# Patient Record
Sex: FEMALE
[De-identification: names, ages, dates, MRNs, and addresses within clinical notes are randomized per-mention and may not be internally consistent; named-entity substitution may affect disease eponyms.]

---

## 2017-08-21 ENCOUNTER — HOSPITAL ENCOUNTER (OUTPATIENT)
Dept: HOSPITAL 62 - WI | Age: 52
End: 2017-08-21
Attending: INTERNAL MEDICINE
Payer: COMMERCIAL

## 2017-08-21 DIAGNOSIS — Z12.31: Primary | ICD-10-CM

## 2017-08-21 PROCEDURE — G0202 SCR MAMMO BI INCL CAD: HCPCS

## 2017-08-21 PROCEDURE — 77067 SCR MAMMO BI INCL CAD: CPT

## 2017-08-22 NOTE — WOMENS IMAGING REPORT
EXAM DESCRIPTION:  BILAT SCREENING MAMMO W/CAD



COMPLETED DATE/TIME:  8/21/2017 10:57 am



REASON FOR STUDY:  ROUTINE SCREENING; Z12.31 Z12.31  ENCNTR SCREEN MAMMOGRAM FOR MALIGNANT NEOPLASM O
F DYLAN



COMPARISON:  2009, 2016



TECHNIQUE:  Standard craniocaudal and mediolateral oblique views of each breast recorded using Mingle360a
l acquisition.



LIMITATIONS:  None.



FINDINGS:  No masses, calcifications or architectural distortion. No areas of suspicion.

Read with the assistance of CAD.

.[facility] - R2 Cenova Version 1.3

.[facility] - R2 Cenova Version 1.3

.[facility] - R2 Cenova Version 2.4

.[facility] [place] R2 Cenova Version 2.4

.[facility] - R2  Version 9.2



IMPRESSION:  NORMAL MAMMOGRAM.  BIRADS 1.



BREAST DENSITY:  a. The breasts are almost entirely fatty.



BIRAD:  1 NEGATIVE



RECOMMENDATION:  ROUTINE SCREENING



COMMENT:  The patient has been notified of the results by letter per SA requirements. Additional no
tification policies are in place for contacting patient with suspicious or incomplete findings.

Quality ID #225: The American College of Radiology recommends an annual screening mammogram for women
 aged 40 years or over. This facility utilizes a reminder system to ensure that all patients receive 
reminder letters, and/or direct phone calls for appointments. This includes reminders for routine scr
eening mammograms, diagnostic mammograms, or other Breast Imaging Interventions when appropriate.  Th
is patient will be placed in the appropriate reminder system.

The American College of Radiology (ACR) has developed recommendations for screening MRI of the breast
s in certain patient populations, to be used in conjunction with mammography.  Breast MRI surveillanc
e may be appropriate for women with more than 20% lifetime risk of developing breast cancer  as deter
mined by genetic testing, significant family history of the disease, or history of mantle radiation f
or Hodgkins Disease.  ACR Practice Guidelines 2008.



TECHNICAL DOCUMENTATION:  FINDING NUMBER: (1)

ASSESSMENT: (1)

JOB ID:  [id number]

 2011 [employer]- All Rights Reserved

## 2020-09-15 ENCOUNTER — HOSPITAL ENCOUNTER (OUTPATIENT)
Dept: HOSPITAL 62 - WI | Age: 55
End: 2020-09-15
Attending: PHYSICIAN ASSISTANT
Payer: COMMERCIAL

## 2020-09-15 DIAGNOSIS — Z12.31: Primary | ICD-10-CM

## 2020-09-15 PROCEDURE — 77067 SCR MAMMO BI INCL CAD: CPT

## 2020-09-15 NOTE — WOMENS IMAGING REPORT
EXAM DESCRIPTION:  BILAT SCREENING MAMMO W/CAD



IMAGES COMPLETED DATE/TIME:  9/15/2020 1:10 pm



REASON FOR STUDY:  Z12.31 ENCOUNTER FOR SCREENING MAMMOGRAM FOR MALIGNANT NEOPLASM OF BREAST Z12.31  
ENCNTR SCREEN MAMMOGRAM FOR MALIGNANT NEOPLASM OF DYLAN



COMPARISON:  2009, 2016



EXAM PARAMETERS:  Standard craniocaudal and mediolateral oblique views of each breast recorded using 
digital acquisition.

Read with the assistance of CAD.

.[facility] - R2  Version 9.2



LIMITATIONS:  None.



FINDINGS:  No suspicious masses, suspicious calcifications or architectural distortion. No areas of c
oncern.



IMPRESSION:  NEGATIVE MAMMOGRAM.  BIRADS 1



BREAST DENSITY:  b. There are scattered areas of fibroglandular density.



BIRAD:  ASSESSMENT:  1 NEGATIVE



RECOMMENDATION:  ROUTINE SCREENING



COMMENT:  The patient has been notified of the results by letter per MQSA requirements. Additional no
tification policies are in place for contacting patient with suspicious or incomplete findings.

Quality ID #225: The American College of Radiology recommends an annual screening mammogram for women
 aged 40 years or over. This facility utilizes a reminder system to ensure that all patients receive 
reminder letters, and/or direct phone calls for appointments. This includes reminders for routine scr
eening mammograms, diagnostic mammograms, or other Breast Imaging Interventions when appropriate.  Th
is patient will be placed in the appropriate reminder system.



TECHNICAL DOCUMENTATION:  FINDING NUMBER: (1)

ASSESSMENT: (1)

JOB ID:  [id number]

 2011 [employer]- All Rights Reserved



Reading location - IP/workstation name: [email or web address]